# Patient Record
Sex: FEMALE | Race: WHITE | NOT HISPANIC OR LATINO | Employment: STUDENT | URBAN - METROPOLITAN AREA
[De-identification: names, ages, dates, MRNs, and addresses within clinical notes are randomized per-mention and may not be internally consistent; named-entity substitution may affect disease eponyms.]

---

## 2023-12-20 ENCOUNTER — ANCILLARY PROCEDURE (OUTPATIENT)
Dept: RADIOLOGY | Facility: CLINIC | Age: 18
End: 2023-12-20
Payer: COMMERCIAL

## 2023-12-20 ENCOUNTER — ANCILLARY ORDERS (OUTPATIENT)
Dept: RADIOLOGY | Facility: CLINIC | Age: 18
End: 2023-12-20
Payer: COMMERCIAL

## 2023-12-20 DIAGNOSIS — Z01.818 ENCOUNTER FOR OTHER PREPROCEDURAL EXAMINATION: ICD-10-CM

## 2023-12-20 DIAGNOSIS — Z01.810 ENCOUNTER FOR PREPROCEDURAL CARDIOVASCULAR EXAMINATION: ICD-10-CM

## 2023-12-20 DIAGNOSIS — Z01.811 ENCOUNTER FOR PREPROCEDURAL RESPIRATORY EXAMINATION: ICD-10-CM

## 2023-12-20 DIAGNOSIS — Z01.810 ENCOUNTER FOR PREPROCEDURAL CARDIOVASCULAR EXAMINATION: Primary | ICD-10-CM

## 2023-12-20 LAB
ATRIAL RATE: 89 BPM
P AXIS: 18 DEGREES
PR INTERVAL: 123 MS
Q ONSET: 249 MS
QRS COUNT: 15 BEATS
QRS DURATION: 95 MS
QT INTERVAL: 356 MS
QTC CALCULATION(BAZETT): 434 MS
QTC FREDERICIA: 405 MS
R AXIS: 43 DEGREES
T AXIS: 25 DEGREES
T OFFSET: 427 MS
VENTRICULAR RATE: 89 BPM

## 2023-12-20 PROCEDURE — 71046 X-RAY EXAM CHEST 2 VIEWS: CPT

## 2023-12-20 PROCEDURE — 93010 ELECTROCARDIOGRAM REPORT: CPT | Performed by: INTERNAL MEDICINE

## 2023-12-20 PROCEDURE — 93005 ELECTROCARDIOGRAM TRACING: CPT

## 2023-12-27 ENCOUNTER — ANCILLARY PROCEDURE (OUTPATIENT)
Dept: RADIOLOGY | Facility: CLINIC | Age: 18
End: 2023-12-27
Payer: COMMERCIAL

## 2023-12-27 DIAGNOSIS — Z01.811 ENCOUNTER FOR PREPROCEDURAL RESPIRATORY EXAMINATION: ICD-10-CM

## 2023-12-27 DIAGNOSIS — Z01.818 ENCOUNTER FOR OTHER PREPROCEDURAL EXAMINATION: ICD-10-CM

## 2023-12-27 DIAGNOSIS — Z01.810 ENCOUNTER FOR PREPROCEDURAL CARDIOVASCULAR EXAMINATION: ICD-10-CM

## 2023-12-27 PROCEDURE — 76641 ULTRASOUND BREAST COMPLETE: CPT | Mod: 50

## 2023-12-27 PROCEDURE — 76642 ULTRASOUND BREAST LIMITED: CPT | Mod: BILATERAL PROCEDURE | Performed by: STUDENT IN AN ORGANIZED HEALTH CARE EDUCATION/TRAINING PROGRAM

## 2024-01-04 ENCOUNTER — CLINICAL SUPPORT (OUTPATIENT)
Dept: RADIOLOGY | Facility: CLINIC | Age: 19
End: 2024-01-04
Payer: COMMERCIAL

## 2024-01-04 ENCOUNTER — ANCILLARY ORDERS (OUTPATIENT)
Dept: RADIOLOGY | Facility: CLINIC | Age: 19
End: 2024-01-04
Payer: COMMERCIAL

## 2024-01-04 DIAGNOSIS — Z01.812 ENCOUNTER FOR PREPROCEDURAL LABORATORY EXAMINATION: Primary | ICD-10-CM

## 2024-01-04 PROCEDURE — 93010 ELECTROCARDIOGRAM REPORT: CPT | Performed by: INTERNAL MEDICINE

## 2024-01-04 PROCEDURE — 93005 ELECTROCARDIOGRAM TRACING: CPT

## 2024-01-06 LAB
ATRIAL RATE: 86 BPM
P AXIS: 10 DEGREES
PR INTERVAL: 118 MS
Q ONSET: 252 MS
QRS COUNT: 14 BEATS
QRS DURATION: 97 MS
QT INTERVAL: 355 MS
QTC CALCULATION(BAZETT): 422 MS
QTC FREDERICIA: 398 MS
R AXIS: 53 DEGREES
T AXIS: 29 DEGREES
T OFFSET: 429 MS
VENTRICULAR RATE: 85 BPM

## 2025-01-15 ENCOUNTER — OFFICE VISIT (OUTPATIENT)
Dept: URGENT CARE | Age: 20
End: 2025-01-15
Payer: COMMERCIAL

## 2025-01-15 VITALS
HEART RATE: 81 BPM | DIASTOLIC BLOOD PRESSURE: 66 MMHG | SYSTOLIC BLOOD PRESSURE: 121 MMHG | OXYGEN SATURATION: 97 % | RESPIRATION RATE: 20 BRPM | TEMPERATURE: 98.1 F
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
## 2025-01-15 DIAGNOSIS — F07.81 POST CONCUSSIVE SYNDROME: ICD-10-CM

## 2025-01-15 RX ORDER — ONDANSETRON 4 MG/1
4 TABLET, ORALLY DISINTEGRATING ORAL EVERY 8 HOURS PRN
Qty: 20 TABLET | Refills: 0 | Status: SHIPPED | OUTPATIENT
Start: 2025-01-15 | End: 2025-01-22

## 2025-01-15 NOTE — LETTER
January 15, 2025     Patient: Muna Brush   YOB: 2005   Date of Visit: 1/15/2025       To Whom It May Concern:    Muna Brush was seen in my clinic on 1/15/2025 at 11:45 am. Please excuse Muna for her absence from work on this day to make the appointment. Please allow the patient a 10-15 minute break every 1-2 hours until Sunday, January 19th. Return to full restrictions afterwards.     If you have any questions or concerns, please don't hesitate to call.         Sincerely,         Shira Curran, APRN-CNP        CC: No Recipients
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
## 2025-03-18 ENCOUNTER — OFFICE VISIT (OUTPATIENT)
Dept: URGENT CARE | Age: 20
End: 2025-03-18
Payer: COMMERCIAL

## 2025-03-18 VITALS
HEART RATE: 100 BPM | SYSTOLIC BLOOD PRESSURE: 109 MMHG | DIASTOLIC BLOOD PRESSURE: 76 MMHG | OXYGEN SATURATION: 98 % | RESPIRATION RATE: 16 BRPM | TEMPERATURE: 98.9 F

## 2025-03-18 DIAGNOSIS — J06.9 VIRAL URI: ICD-10-CM

## 2025-03-18 DIAGNOSIS — R51.9 NONINTRACTABLE EPISODIC HEADACHE, UNSPECIFIED HEADACHE TYPE: Primary | ICD-10-CM

## 2025-03-18 PROCEDURE — 99213 OFFICE O/P EST LOW 20 MIN: CPT | Performed by: PHYSICIAN ASSISTANT

## 2025-03-18 RX ORDER — IBUPROFEN 800 MG/1
800 TABLET ORAL 3 TIMES DAILY PRN
Qty: 60 TABLET | Refills: 0 | Status: SHIPPED | OUTPATIENT
Start: 2025-03-18 | End: 2025-05-17

## 2025-03-18 ASSESSMENT — ENCOUNTER SYMPTOMS
PHOTOPHOBIA: 1
VOMITING: 0
CHEST TIGHTNESS: 0
SHORTNESS OF BREATH: 0
HEADACHES: 1
EYE DISCHARGE: 0
NUMBNESS: 0
EYE ITCHING: 0
WEAKNESS: 0
EYE PAIN: 0
ABDOMINAL PAIN: 0
FEVER: 0
SORE THROAT: 1
LIGHT-HEADEDNESS: 0
RHINORRHEA: 1
DIZZINESS: 0
NAUSEA: 1
WHEEZING: 0
COUGH: 1
TREMORS: 0
CHILLS: 0
EYE REDNESS: 0

## 2025-03-18 NOTE — PROGRESS NOTES
Subjective   Patient ID: Muna Brush is a 19 y.o. female. They present today with a chief complaint of Headache (Headache 2 times a week since summer time- concern for migraines and wants a referral/C/o feeling light sensitive and nausea with headaches).    History of Present Illness  Patient presents today for episodes of headaches that have been going on twice a week since last summer.  She states when they come they can be unilateral or bilateral.  She does have associated photophobia, phonophobia and nausea with her headaches.  She denies any associated vision changes, vomiting, numbness, tingling, acute muscle weakness with her headaches.  She has not been able to identify any known triggers.  She states she will take Motrin for them and rest then they will resolve.  She is hopeful for referral out to primary care for further evaluation of her symptoms.  She denies any recent head injury. LMP: 2/24-28. She uses condoms for birth control.    She also states she has been sick with cold symptoms for a couple of days.  She admits to nasal congestion, runny nose, scratchy throat and a slight cough.  She denies any fevers, chills, ear pain, chest tightness, wheezing or shortness of breath.          Past Medical History  Allergies as of 03/18/2025    (No Known Allergies)       (Not in a hospital admission)       No past medical history on file.    No past surgical history on file.         Review of Systems  Review of Systems   Constitutional:  Negative for chills and fever.   HENT:  Positive for congestion, rhinorrhea and sore throat (scratchy). Negative for ear discharge and ear pain.    Eyes:  Positive for photophobia. Negative for pain, discharge, redness, itching and visual disturbance.   Respiratory:  Positive for cough. Negative for chest tightness, shortness of breath and wheezing.    Gastrointestinal:  Positive for nausea. Negative for abdominal pain and vomiting.   Neurological:  Positive for headaches.  Negative for dizziness, tremors, syncope, weakness, light-headedness and numbness.                                  Objective    Vitals:    03/18/25 1405   BP: 109/76   Pulse: 100   Resp: 16   Temp: 37.2 °C (98.9 °F)   SpO2: 98%     No LMP recorded.    Physical Exam  Vitals reviewed.   Constitutional:       General: She is not in acute distress.     Appearance: She is not ill-appearing.   HENT:      Head: Normocephalic and atraumatic.      Right Ear: Tympanic membrane and ear canal normal.      Left Ear: Tympanic membrane and ear canal normal.      Nose: Congestion and rhinorrhea (clear) present.      Mouth/Throat:      Mouth: Mucous membranes are moist.      Pharynx: No oropharyngeal exudate or posterior oropharyngeal erythema.   Eyes:      General:         Right eye: No discharge.         Left eye: No discharge.      Extraocular Movements: Extraocular movements intact.      Conjunctiva/sclera: Conjunctivae normal.      Pupils: Pupils are equal, round, and reactive to light.   Cardiovascular:      Rate and Rhythm: Normal rate and regular rhythm.      Heart sounds: Normal heart sounds.   Pulmonary:      Effort: Pulmonary effort is normal. No respiratory distress.      Breath sounds: Normal breath sounds. No wheezing, rhonchi or rales.   Lymphadenopathy:      Cervical: No cervical adenopathy.   Neurological:      Mental Status: She is oriented to person, place, and time.      Cranial Nerves: No cranial nerve deficit.      Sensory: No sensory deficit.      Motor: No weakness.      Gait: Gait normal.         Procedures    Point of Care Test & Imaging Results from this visit  No results found for this visit on 03/18/25.   No results found.    Diagnostic study results (if any) were reviewed by Kelly Read PA-C.    Assessment/Plan   Allergies, medications, history, and pertinent labs/EKGs/Imaging reviewed by Kelly Read PA-C.     Medical Decision Making  Patient presents for episodic headaches for approximately 9  months that do sound consistent with likely underlying migraines.  She was given a prescription for Motrin to take.  She declined injection of Toradol during today's visit.  I did also give her referral to primary care for further evaluation of her symptoms.  We discussed ER precautions for any acute worsening of her symptoms.  She also presents with upper respiratory illness symptoms that appears to be due to viral URI.  We discussed that there are no signs of secondary otitis media, sinusitis, pneumonia or acute pharyngitis today.  Discussed she can use over-the-counter decongestants and cough medicine for symptoms.  Patient was agreeable with this plan and had no questions.    Orders and Diagnoses  Diagnoses and all orders for this visit:  Nonintractable episodic headache, unspecified headache type  -     ibuprofen 800 mg tablet; Take 1 tablet (800 mg) by mouth 3 times a day as needed for headaches (headache).  -     Referral to Primary Care; Future  Viral URI      Medical Admin Record      Patient disposition: Home    Electronically signed by Kelly Read PA-C  2:28 PM

## 2025-03-18 NOTE — LETTER
March 18, 2025     Patient: Muna Brush   YOB: 2005   Date of Visit: 3/18/2025       To Whom It May Concern:    Muna Brush was seen in my clinic on 3/18/2025 at 2:00 pm. Please excuse Muna for her absence from school on this day.    If you have any questions or concerns, please don't hesitate to call.         Sincerely,         Kelly Read PA-C

## 2025-04-24 ENCOUNTER — OFFICE VISIT (OUTPATIENT)
Dept: URGENT CARE | Age: 20
End: 2025-04-24
Payer: COMMERCIAL

## 2025-04-24 VITALS
RESPIRATION RATE: 16 BRPM | SYSTOLIC BLOOD PRESSURE: 108 MMHG | HEART RATE: 79 BPM | DIASTOLIC BLOOD PRESSURE: 88 MMHG | TEMPERATURE: 98 F | OXYGEN SATURATION: 98 %
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
## 2025-04-24 RX ORDER — AMOXICILLIN AND CLAVULANATE POTASSIUM 875; 125 MG/1; MG/1
1 TABLET, FILM COATED ORAL 2 TIMES DAILY
Qty: 20 TABLET | Refills: 0 | Status: SHIPPED | OUTPATIENT
Start: 2025-04-24 | End: 2025-05-04

## 2025-04-24 ASSESSMENT — ENCOUNTER SYMPTOMS
CARDIOVASCULAR NEGATIVE: 1
DIAPHORESIS: 1
WOUND: 1
RESPIRATORY NEGATIVE: 1
ACTIVITY CHANGE: 1
COLOR CHANGE: 1

## 2025-04-24 NOTE — PROGRESS NOTES
Subjective   Patient ID: Muna Brush is a 19 y.o. female. They present today with a chief complaint of Animal Bite (R hand dog bite).    History of Present Illness  A 19-year-old female arrives to clinic with chief complaint of left and right hand dog bite.  The patient reports that she was attempting to break a fight between her 2 dogs when the dog became aggressive and bit her right hand.  There are multiple puncture wounds to her right hand and a single puncture wound to her left first digit finger. she denies any numbness, tingling. She is here for evaluation.  Animal Bite      Past Medical History  Allergies as of 04/24/2025    (No Known Allergies)       Prescriptions Prior to Admission[1]     Medical History[2]    Surgical History[3]     reports that she has never smoked. She has never used smokeless tobacco.    Review of Systems  Review of Systems   Constitutional:  Positive for activity change and diaphoresis.   Respiratory: Negative.     Cardiovascular: Negative.    Skin:  Positive for color change and wound.       Objective    Vitals:    04/24/25 1552   BP: 108/88   Pulse: 79   Resp: 16   Temp: 36.7 °C (98 °F)   SpO2: 98%     No LMP recorded.    Physical Exam  Constitutional:       Appearance: Normal appearance. She is normal weight.   Skin:     Findings: Bruising present.   Neurological:      Mental Status: She is alert.         Procedures    Point of Care Test & Imaging Results from this visit  No results found for this visit on 04/24/25.   Imaging  No results found.    Cardiology, Vascular, and Other Imaging  No other imaging results found for the past 2 days      Diagnostic study results (if any) were reviewed by GAIL Velázquez.    Assessment/Plan   Allergies, medications, history, and pertinent labs/EKGs/Imaging reviewed by GAIL Velázquez.     Medical Decision Making  Multiple dog bites noted to her right hand with a small puncture wound to her left first digit finger.   Please review the wound images under the media attachment for further description and analysis of the dog bite.  There are a total of 4 images.    Extensive wound care hygiene was discussed and applied in the office.  Augmentin was sent to her pharmacy.  Wound care referral was placed as well.  The patient reports being involved in a dog bite incident within the last 3 years and was given a Tdap vaccine at that time.  We deferred Tdap vaccine at this time.  Continue with Tylenol and ibuprofen for pain.  For any worsening signs and symptoms, head to the emergency department.    As a result of the work-up, the patient was discharged home.  she was informed of her diagnosis and instructed to come back with any concerns or worsening of condition.  she and was agreeable to the plan as discussed above.  she was given the opportunity to ask questions.  All of the patient's questions were answered.    This document was generated using the assistance of voice recognition software. If there are any errors of spelling, grammar, syntax, or meaning; please feel free to contact me directly for clarification.     Orders and Diagnoses  Diagnoses and all orders for this visit:  Dog bite, initial encounter  -     amoxicillin-clavulanate (Augmentin) 875-125 mg tablet; Take 1 tablet by mouth 2 times a day for 10 days.  -     Referral to Wound Clinic; Future      Medical Admin Record      Patient disposition: Home    Electronically signed by GAIL Velázquez  4:11 PM           [1] (Not in a hospital admission)   [2] History reviewed. No pertinent past medical history.  [3] History reviewed. No pertinent surgical history.

## 2025-04-24 NOTE — PATIENT INSTRUCTIONS
[facility]      [address], [place] [address]    Phone number provided by Urgent Care Staff    Augmentin BID for 10 days sent.

## 2025-06-12 ENCOUNTER — OFFICE VISIT (OUTPATIENT)
Dept: URGENT CARE | Age: 20
End: 2025-06-12
Payer: COMMERCIAL

## 2025-06-12 VITALS
HEART RATE: 96 BPM | RESPIRATION RATE: 18 BRPM | TEMPERATURE: 98.3 F | DIASTOLIC BLOOD PRESSURE: 77 MMHG | SYSTOLIC BLOOD PRESSURE: 121 MMHG | OXYGEN SATURATION: 97 %

## 2025-06-12 DIAGNOSIS — R51.9 NONINTRACTABLE EPISODIC HEADACHE, UNSPECIFIED HEADACHE TYPE: Primary | ICD-10-CM

## 2025-06-12 RX ORDER — IBUPROFEN 800 MG/1
800 TABLET, FILM COATED ORAL 3 TIMES DAILY PRN
Qty: 60 TABLET | Refills: 0 | Status: SHIPPED | OUTPATIENT
Start: 2025-06-12 | End: 2025-08-11

## 2025-06-12 ASSESSMENT — ENCOUNTER SYMPTOMS
CHILLS: 0
EYE PAIN: 0
FEVER: 0
NAUSEA: 0
HEADACHES: 1
RHINORRHEA: 0
SORE THROAT: 0

## 2025-06-12 NOTE — LETTER
June 12, 2025     Patient: Muna Brush   YOB: 2005   Date of Visit: 6/12/2025       To Whom It May Concern:    Muna Brush was seen in my clinic on 6/12/2025 at 2:30 pm. Please excuse Muna for her absence from work on this day.    If you have any questions or concerns, please don't hesitate to call.         Sincerely,         Kelly Read PA-C

## 2025-06-12 NOTE — PROGRESS NOTES
Subjective   Patient ID: Muna Brush is a 20 y.o. female. They present today with a chief complaint of Migraine (Pt advised that she has chronic migraines and needs her Ibuprofen 800 mg prescription renewed. -WW).    History of Present Illness  Patient presents today for episodes of headaches that have been going on twice a week since last summer.  She did have a headache that caused her to miss work yesterday and today. She notes that she took Advil PM yesterday and when she woke up her headache is mostly gone now.  She states when they come they can be unilateral or bilateral.  She does have associated photophobia, phonophobia and nausea with her headaches.  She denies any associated vision changes, vomiting, numbness, tingling, acute muscle weakness with her headaches.  She has not been able to identify any known triggers.  She states she will take Motrin for them and rest then they will resolve.  At her last visit with me in March she was given a referral to primary care though states she never did hear from the referral department in regards to an appointment. She denies any recent head injury. LMP: May 28. She uses condoms for birth control.               Past Medical History  Allergies as of 06/12/2025    (No Known Allergies)       Prescriptions Prior to Admission[1]     Medical History[2]    Surgical History[3]     reports that she has never smoked. She has never used smokeless tobacco.    Review of Systems  Review of Systems   Constitutional:  Negative for chills and fever.   HENT:  Negative for congestion, ear pain, rhinorrhea and sore throat.    Eyes:  Negative for pain and visual disturbance.   Gastrointestinal:  Negative for nausea.   Neurological:  Positive for headaches (no current headache).                                  Objective    Vitals:    06/12/25 1444   BP: 121/77   Pulse: 96   Resp: 18   Temp: 36.8 °C (98.3 °F)   SpO2: 97%     No LMP recorded.    Physical Exam  Vitals reviewed.    Constitutional:       General: She is not in acute distress.     Appearance: She is not ill-appearing.   HENT:      Head: Normocephalic and atraumatic.      Right Ear: Tympanic membrane and ear canal normal.      Left Ear: Tympanic membrane and ear canal normal.      Nose: No congestion or rhinorrhea.      Mouth/Throat:      Mouth: Mucous membranes are moist.      Pharynx: No oropharyngeal exudate or posterior oropharyngeal erythema.   Eyes:      Extraocular Movements: Extraocular movements intact.      Conjunctiva/sclera: Conjunctivae normal.      Pupils: Pupils are equal, round, and reactive to light.   Cardiovascular:      Rate and Rhythm: Normal rate and regular rhythm.      Heart sounds: Normal heart sounds.   Pulmonary:      Effort: Pulmonary effort is normal. No respiratory distress.      Breath sounds: Normal breath sounds.   Musculoskeletal:      Cervical back: No rigidity.   Neurological:      General: No focal deficit present.      Mental Status: She is alert and oriented to person, place, and time.      Cranial Nerves: No cranial nerve deficit.      Sensory: No sensory deficit.      Motor: No weakness.      Gait: Gait normal.         Procedures    Point of Care Test & Imaging Results from this visit  No results found for this visit on 06/12/25.   Imaging  No results found.    Cardiology, Vascular, and Other Imaging  No other imaging results found for the past 2 days      Diagnostic study results (if any) were reviewed by Kelly Read PA-C.    Assessment/Plan   Allergies, medications, history, and pertinent labs/EKGs/Imaging reviewed by Kelly Read PA-C.     Medical Decision Making  Patient presents for episodes of headaches that have been present for 1 year.  She does not have a headache during today's visit.  She was given a refill of her ibuprofen 800s as these have been helpful for her with her headaches.  She was also given a referral to primary care and neurology for further evaluation and  ongoing treatment of her symptoms.  Patient verbalized understanding and was agreeable with this plan.    Orders and Diagnoses  Diagnoses and all orders for this visit:  Nonintractable episodic headache, unspecified headache type  -     ibuprofen 800 mg tablet; Take 1 tablet (800 mg) by mouth 3 times a day as needed for mild pain (1 - 3) (pain).  -     Referral to Primary Care; Future  -     Referral to Neurology; Future      Medical Admin Record      Patient disposition: Home    Electronically signed by Kelly Read PA-C  3:03 PM           [1] (Not in a hospital admission)   [2] No past medical history on file.  [3] No past surgical history on file.

## 2025-10-23 ENCOUNTER — APPOINTMENT (OUTPATIENT)
Dept: NEUROLOGY | Facility: CLINIC | Age: 20
End: 2025-10-23
Payer: COMMERCIAL